# Patient Record
Sex: MALE | Race: WHITE | NOT HISPANIC OR LATINO | Employment: STUDENT | URBAN - METROPOLITAN AREA
[De-identification: names, ages, dates, MRNs, and addresses within clinical notes are randomized per-mention and may not be internally consistent; named-entity substitution may affect disease eponyms.]

---

## 2023-01-31 ENCOUNTER — OFFICE VISIT (OUTPATIENT)
Dept: FAMILY MEDICINE | Facility: CLINIC | Age: 28
End: 2023-01-31
Payer: COMMERCIAL

## 2023-01-31 VITALS
HEIGHT: 67 IN | BODY MASS INDEX: 20.72 KG/M2 | WEIGHT: 132 LBS | DIASTOLIC BLOOD PRESSURE: 72 MMHG | OXYGEN SATURATION: 99 % | SYSTOLIC BLOOD PRESSURE: 124 MMHG | HEART RATE: 110 BPM

## 2023-01-31 DIAGNOSIS — M54.6 CHRONIC RIGHT-SIDED THORACIC BACK PAIN: Primary | ICD-10-CM

## 2023-01-31 DIAGNOSIS — R55 SYNCOPE, UNSPECIFIED SYNCOPE TYPE: ICD-10-CM

## 2023-01-31 DIAGNOSIS — Z00.00 ENCOUNTER FOR MEDICAL EXAMINATION TO ESTABLISH CARE: ICD-10-CM

## 2023-01-31 DIAGNOSIS — R10.11 RUQ PAIN: ICD-10-CM

## 2023-01-31 DIAGNOSIS — Z79.899 ENCOUNTER FOR LONG-TERM (CURRENT) USE OF MEDICATIONS: ICD-10-CM

## 2023-01-31 DIAGNOSIS — G89.29 CHRONIC RIGHT-SIDED THORACIC BACK PAIN: Primary | ICD-10-CM

## 2023-01-31 DIAGNOSIS — Z11.59 NEED FOR HEPATITIS C SCREENING TEST: ICD-10-CM

## 2023-01-31 DIAGNOSIS — R55 SYNCOPE AND COLLAPSE: ICD-10-CM

## 2023-01-31 DIAGNOSIS — R42 DIZZINESS AND GIDDINESS: ICD-10-CM

## 2023-01-31 DIAGNOSIS — H53.8 BLURRED VISION: ICD-10-CM

## 2023-01-31 DIAGNOSIS — Z11.4 ENCOUNTER FOR SCREENING FOR HIV: ICD-10-CM

## 2023-01-31 PROBLEM — R07.9 CHEST PAIN: Status: ACTIVE | Noted: 2019-10-02

## 2023-01-31 PROBLEM — R00.2 INTERMITTENT PALPITATIONS: Status: ACTIVE | Noted: 2019-10-02

## 2023-01-31 PROBLEM — F41.9 ANXIETY: Status: ACTIVE | Noted: 2019-10-02

## 2023-01-31 PROCEDURE — 3074F PR MOST RECENT SYSTOLIC BLOOD PRESSURE < 130 MM HG: ICD-10-PCS | Mod: CPTII,S$GLB,, | Performed by: FAMILY MEDICINE

## 2023-01-31 PROCEDURE — 3078F PR MOST RECENT DIASTOLIC BLOOD PRESSURE < 80 MM HG: ICD-10-PCS | Mod: CPTII,S$GLB,, | Performed by: FAMILY MEDICINE

## 2023-01-31 PROCEDURE — 1160F PR REVIEW ALL MEDS BY PRESCRIBER/CLIN PHARMACIST DOCUMENTED: ICD-10-PCS | Mod: CPTII,S$GLB,, | Performed by: FAMILY MEDICINE

## 2023-01-31 PROCEDURE — 99204 OFFICE O/P NEW MOD 45 MIN: CPT | Mod: S$GLB,,, | Performed by: FAMILY MEDICINE

## 2023-01-31 PROCEDURE — 99204 PR OFFICE/OUTPT VISIT, NEW, LEVL IV, 45-59 MIN: ICD-10-PCS | Mod: S$GLB,,, | Performed by: FAMILY MEDICINE

## 2023-01-31 PROCEDURE — 3008F PR BODY MASS INDEX (BMI) DOCUMENTED: ICD-10-PCS | Mod: CPTII,S$GLB,, | Performed by: FAMILY MEDICINE

## 2023-01-31 PROCEDURE — 1159F PR MEDICATION LIST DOCUMENTED IN MEDICAL RECORD: ICD-10-PCS | Mod: CPTII,S$GLB,, | Performed by: FAMILY MEDICINE

## 2023-01-31 PROCEDURE — 3008F BODY MASS INDEX DOCD: CPT | Mod: CPTII,S$GLB,, | Performed by: FAMILY MEDICINE

## 2023-01-31 PROCEDURE — 99999 PR PBB SHADOW E&M-NEW PATIENT-LVL IV: CPT | Mod: PBBFAC,,, | Performed by: FAMILY MEDICINE

## 2023-01-31 PROCEDURE — 99999 PR PBB SHADOW E&M-NEW PATIENT-LVL IV: ICD-10-PCS | Mod: PBBFAC,,, | Performed by: FAMILY MEDICINE

## 2023-01-31 PROCEDURE — 1160F RVW MEDS BY RX/DR IN RCRD: CPT | Mod: CPTII,S$GLB,, | Performed by: FAMILY MEDICINE

## 2023-01-31 PROCEDURE — 3074F SYST BP LT 130 MM HG: CPT | Mod: CPTII,S$GLB,, | Performed by: FAMILY MEDICINE

## 2023-01-31 PROCEDURE — 3078F DIAST BP <80 MM HG: CPT | Mod: CPTII,S$GLB,, | Performed by: FAMILY MEDICINE

## 2023-01-31 PROCEDURE — 1159F MED LIST DOCD IN RCRD: CPT | Mod: CPTII,S$GLB,, | Performed by: FAMILY MEDICINE

## 2023-01-31 NOTE — ASSESSMENT & PLAN NOTE
Associated with other constellation of symptoms.  No tenderness on exam.  I recommend imaging with ultrasound to assess the liver and kidneys.  Do not suspect musculoskeletal at this time.  Checking labs.

## 2023-01-31 NOTE — PATIENT INSTRUCTIONS
Follow up in about 6 weeks (around 3/14/2023), or if symptoms worsen or fail to improve, for results.     Dear patient,   As a result of recent federal legislation (The Federal Cures Act), you may receive lab or pathology results from your visit in your [facility] account before your physician is able to contact you. Your physician or their representative will relay the results to you with their recommendations at their soonest availability.     If no improvement in symptoms or symptoms worsen, please be advised to call MD, follow-up at clinic and/or go to ER if becomes severe.    Jarod Tello M.D.        We Offer TELEHEALTH & Same Day Appointments!   Book your Telehealth appointment with me through my nurse or   Clinic appointments on [employer]!    [address] [place] [address]    Office: [phone]   FAX: [phone]    Check out my Facebook Page and Follow Me at: [email or web address]    Check out my website at [employer] by clicking on: [email or web address]    To Schedule appointments online, go to [employer]: [email or web address]

## 2023-01-31 NOTE — PROGRESS NOTES
PLAN:      Problem List Items Addressed This Visit       Chronic right-sided thoracic back pain - Primary (Chronic)     Associated with other constellation of symptoms.  No tenderness on exam.  I recommend imaging with ultrasound to assess the liver and kidneys.  Do not suspect musculoskeletal at this time.  Checking labs.         Relevant Orders    Lipid Panel    Hepatitis C Antibody    HIV 1/2 Ag/Ab (4th Gen)    Hemoglobin A1C    CBC Without Differential    Comprehensive Metabolic Panel    TSH    Urinalysis, Reflex to Urine Culture Urine, Clean Catch    US Abdomen Complete    Encounter for long-term (current) use of medications (Chronic)     Complete history and physical was completed today.  Complete and thorough medication reconciliation was performed.  Discussed risks and benefits of medications.  Advised patient on orders and health maintenance.  We discussed old records and old labs if available.  Will request any records not available through epic.  Continue current medications listed on your summary sheet.           Relevant Orders    Lipid Panel    Hepatitis C Antibody    HIV 1/2 Ag/Ab (4th Gen)    Hemoglobin A1C    CBC Without Differential    Comprehensive Metabolic Panel    TSH    Urinalysis, Reflex to Urine Culture Urine, Clean Catch    Encounter for medical examination to establish care     Complete history and physical was completed today.  Complete and thorough medication reconciliation was performed.  Discussed risks and benefits of medications.  Advised patient on orders and health maintenance.  We discussed old records and old labs if available.  Will request any records not available through epic.  Continue current medications listed on your summary sheet.           Relevant Orders    Lipid Panel    Hepatitis C Antibody    HIV 1/2 Ag/Ab (4th Gen)    Hemoglobin A1C    CBC Without Differential    Comprehensive Metabolic Panel    TSH    Urinalysis, Reflex to Urine Culture Urine, Clean Catch     Syncope and collapse     Reviewing records.  I recommend MRI of the brain with and without contrast full panel of labs also.  If no improvement or worsening consider referral back to Neurology and Cardiology for further evaluation.    The event that he had sound like it was vasovagal at the time.  However he is having chronic symptoms of headache, blurred vision and dizziness.          Relevant Orders    MRI Brain W WO Contrast    Blurred vision    Relevant Orders    Lipid Panel    Hepatitis C Antibody    HIV 1/2 Ag/Ab (4th Gen)    Hemoglobin A1C    CBC Without Differential    Comprehensive Metabolic Panel    TSH    Urinalysis, Reflex to Urine Culture Urine, Clean Catch    Dizziness and giddiness    Relevant Orders    MRI Brain W WO Contrast     Other Visit Diagnoses       RUQ pain        Relevant Orders    US Abdomen Complete    Encounter for screening for HIV        Relevant Orders    HIV 1/2 Ag/Ab (4th Gen)    Need for hepatitis C screening test        Relevant Orders    Hepatitis C Antibody        Patient due for hepatitis C and HIV screening.  He does have tattoos.  No other known screening test.  Patient is due for hepatitis C screening.   Patient advised of risk of ruy hepatitis C including being Born between 1944 and 1965, blood transfusions prior to 1992, IV or nasal drug use, tattoos, sexual intercourse.  Patient denies being tested.  Patient denies known history of hepatitis-C.    No results found for: HAV, HEPAIGM, HEPBIGM, HEPBCAB, HBEAG, HEPCAB    Future Appointments       Date Specialty Appt Notes    2/14/2023 Radiology     2/14/2023 Radiology            Medication Management for assessment above:       Jarod Tello M.D.  ==========================================================================  Subjective:   Patient ID: Malachi Gastelum is a 27 y.o. male.  has no past medical history on file.   Chief Complaint: Back Pain and Establish Care      Problem List Items Addressed This Visit        Chronic right-sided thoracic back pain - Primary (Chronic)    Overview     Chronic.  Right-sided mid back pain.  Patient reports pain is random and does not always occur with certain movements.  Patient reports he is not had any injury or trauma.  It has been on and off since October.  Patient is concerned about his liver as he was drinking large amount about alcohol previously.  Patient was drinking whiskey.  Denies any blood in the urine.         Current Assessment & Plan     Associated with other constellation of symptoms.  No tenderness on exam.  I recommend imaging with ultrasound to assess the liver and kidneys.  Do not suspect musculoskeletal at this time.  Checking labs.         Encounter for long-term (current) use of medications (Chronic)    Overview     CHRONIC. Stable. Compliant with medications for managed conditions. See medication list. No SE reported.   Routine lab analysis is being monitored. Refills were addressed.  No results found for: WBC, HGB, HCT, MCV, PLT      Chemistry    No results found for: NA, K, CL, CO2, BUN, CREATININE, GLU No results found for: CALCIUM, ALKPHOS, AST, ALT, BILITOT, ESTGFRAFRICA, EGFRNONAA       No results found for: TSH, P3BGNAW, Y0NTIJT, THYROIDAB, FREET4, T3FREE           Current Assessment & Plan     Complete history and physical was completed today.  Complete and thorough medication reconciliation was performed.  Discussed risks and benefits of medications.  Advised patient on orders and health maintenance.  We discussed old records and old labs if available.  Will request any records not available through epic.  Continue current medications listed on your summary sheet.           Encounter for medical examination to establish care    Overview     PREV PCP: MD Javad   Neuro peds years ago            Current Assessment & Plan     Complete history and physical was completed today.  Complete and thorough medication reconciliation was performed.  Discussed  risks and benefits of medications.  Advised patient on orders and health maintenance.  We discussed old records and old labs if available.  Will request any records not available through epic.  Continue current medications listed on your summary sheet.           Syncope and collapse    Overview     Chronic.  Recurrent.  Patient reports he has had generalized weakness spells since at least 2013 per medical records.  Patient's was seen by pediatric neurology.  More recently patient had episode of near-syncope after having a bowel movement and hit his head on a desk when walking back to his desk.  Patient states that he lost consciousness for a few seconds before regaining consciousness.  No seizure activity was noted.    He was evaluated at emergency department with chest x-ray EKG and blood work.         Current Assessment & Plan     Reviewing records.  I recommend MRI of the brain with and without contrast full panel of labs also.  If no improvement or worsening consider referral back to Neurology and Cardiology for further evaluation.    The event that he had sound like it was vasovagal at the time.  However he is having chronic symptoms of headache, blurred vision and dizziness.          Blurred vision    Dizziness and giddiness     Other Visit Diagnoses       RUQ pain        Encounter for screening for HIV        Need for hepatitis C screening test                 Review of patient's allergies indicates:   Allergen Reactions    Amoxicillin Other (See Comments)     Childhood unknown     No current outpatient medications   I have reviewed the PMH, social history, FamilyHx, surgical history, allergies and medications documented / confirmed by the patient at the time of this visit.  Review of Systems   Constitutional:  Negative for chills, fatigue, fever and unexpected weight change.   HENT:  Negative for ear pain and sore throat.    Eyes:  Negative for redness and visual disturbance.   Respiratory:  Negative for  "cough and shortness of breath.    Cardiovascular:  Negative for chest pain and palpitations.   Gastrointestinal:  Positive for abdominal pain. Negative for nausea and vomiting.   Endocrine: Negative for cold intolerance and heat intolerance.   Genitourinary:  Negative for difficulty urinating and hematuria.   Musculoskeletal:  Positive for arthralgias and back pain. Negative for myalgias.   Skin:  Negative for rash and wound.   Allergic/Immunologic: Negative for environmental allergies and food allergies.   Neurological:  Positive for syncope, weakness and headaches.   Hematological:  Negative for adenopathy. Does not bruise/bleed easily.   Psychiatric/Behavioral:  Negative for sleep disturbance. The patient is not nervous/anxious.    Objective:   /72   Pulse 110   Ht 5' 6.7" (1.694 m)   Wt 59.9 kg (132 lb)   SpO2 99%   BMI 20.86 kg/m²   Physical Exam  Vitals and nursing note reviewed.   Constitutional:       General: He is not in acute distress.     Appearance: He is well-developed. He is not ill-appearing or diaphoretic.   HENT:      Head: Normocephalic and atraumatic.      Right Ear: External ear normal.      Left Ear: External ear normal.      Nose: Nose normal. No rhinorrhea.   Eyes:      Extraocular Movements: Extraocular movements intact.      Pupils: Pupils are equal, round, and reactive to light.   Cardiovascular:      Rate and Rhythm: Normal rate.      Pulses: Normal pulses.   Pulmonary:      Effort: Pulmonary effort is normal. No respiratory distress.      Breath sounds: Normal breath sounds.   Abdominal:      General: Bowel sounds are normal.      Palpations: Abdomen is soft.      Tenderness: There is generalized abdominal tenderness. There is no right CVA tenderness, left CVA tenderness, guarding or rebound. Negative signs include Benson's sign, Rovsing's sign, McBurney's sign, psoas sign and obturator sign.      Hernia: No hernia is present.   Musculoskeletal:         General: Normal range " of motion.      Cervical back: Normal range of motion and neck supple.      Thoracic back: No swelling, edema, deformity, signs of trauma, spasms, tenderness or bony tenderness. Normal range of motion.      Lumbar back: No tenderness. Negative right straight leg raise test and negative left straight leg raise test.        Back:       Comments: Area of pain when it occurs however not currently having this pain.  Not reproducible on exam.   Skin:     General: Skin is warm and dry.      Capillary Refill: Capillary refill takes less than 2 seconds.      Findings: No rash.   Neurological:      General: No focal deficit present.      Mental Status: He is alert and oriented to person, place, and time. Mental status is at baseline.      Cranial Nerves: No cranial nerve deficit.      Motor: No weakness.      Gait: Gait normal.   Psychiatric:         Attention and Perception: He is attentive.         Mood and Affect: Mood normal. Mood is not anxious or depressed. Affect is not labile, blunt, angry or inappropriate.         Speech: He is communicative. Speech is not rapid and pressured, delayed, slurred or tangential.         Behavior: Behavior normal. Behavior is not agitated, slowed, aggressive, withdrawn, hyperactive or combative.         Thought Content: Thought content normal. Thought content is not paranoid or delusional. Thought content does not include homicidal or suicidal ideation. Thought content does not include homicidal or suicidal plan.         Cognition and Memory: Memory is not impaired.         Judgment: Judgment normal. Judgment is not impulsive or inappropriate.       Assessment:     1. Chronic right-sided thoracic back pain    2. Encounter for medical examination to establish care    3. Blurred vision    4. Syncope, unspecified syncope type    5. Encounter for long-term (current) use of medications    6. Dizziness and giddiness    7. Syncope and collapse    8. RUQ pain    9. Encounter for screening for HIV     10. Need for hepatitis C screening test      MDM:   High medical risk.  Moderate to high risk.  Total time: 46 minutes.  This includes total time spent on the encounter, which includes face to face time and non-face to face time preparing to see the patient (eg, review of previous medical records, tests), Obtaining and/or reviewing separately obtained history, documenting clinical information in the electronic or other health record, independently interpreting results (not separately reported)/communicating results to the patient/family/caregiver, and/or care coordination (not separately reported).    I have Reviewed and summarized old records.  I have performed thorough medication reconciliation today and discussed risk and benefits of medications.  I have reviewed labs and discussed with patient.  All questions were answered.  I am requesting old records and will review them once they are available.PREV PCP: MD Javad   Neuro peds years ago     I have signed for the following orders AND/OR meds.  Orders Placed This Encounter   Procedures    MRI Brain W WO Contrast     Standing Status:   Future     Standing Expiration Date:   1/31/2024     Order Specific Question:   Does the patient have a pacemaker or a defibrillator (Note: Some facilities may not be able to schedule an MRI for patients with pacemakers and defibrillators. You should contact your local radiology department to determine if this is the case.)?     Answer:   No     Order Specific Question:   Does the patient have an aneurysm or surgical clip, pump, nerve/brain stimulator, middle/inner ear prosthesis, or other metal implant or foreign object (bullet, shrapnel)? If they have a card related to their implant, ask them to bring it. Issues related to the implant may cause the MRI to be delayed.     Answer:   No     Order Specific Question:   Is the patient claustrophobic?     Answer:   No     Order Specific Question:   Will the patient require  sedation?     Answer:   No     Order Specific Question:   Does the patient have any of the following conditions? Diabetes, History of Renal Disease or Hypertension requiring medical therapy?     Answer:   No     Order Specific Question:   May the Radiologist modify the order per protocol to meet the clinical needs of the patient?     Answer:   Yes     Order Specific Question:   Is this part of a Research Study?     Answer:   No     Order Specific Question:   Does the patient have on a skin patch for medication with aluminized backing?     Answer:   No    US Abdomen Complete     Standing Status:   Future     Standing Expiration Date:   1/31/2024     Order Specific Question:   May the Radiologist modify the order per protocol to meet the clinical needs of the patient?     Answer:   Yes     Order Specific Question:   Release to patient     Answer:   Immediate    Lipid Panel     Standing Status:   Future     Standing Expiration Date:   3/31/2024    Hepatitis C Antibody     Standing Status:   Future     Standing Expiration Date:   3/31/2024    HIV 1/2 Ag/Ab (4th Gen)     Standing Status:   Future     Standing Expiration Date:   3/31/2024    Hemoglobin A1C     Standing Status:   Future     Standing Expiration Date:   3/31/2024    CBC Without Differential     Standing Status:   Future     Standing Expiration Date:   3/31/2024    Comprehensive Metabolic Panel     Standing Status:   Future     Standing Expiration Date:   3/31/2024    TSH     Standing Status:   Future     Standing Expiration Date:   3/31/2024    Urinalysis, Reflex to Urine Culture Urine, Clean Catch     Standing Status:   Future     Standing Expiration Date:   7/31/2024     Order Specific Question:   Preferred Collection Type     Answer:   Urine, Clean Catch     Order Specific Question:   Specimen Source     Answer:   Urine           Follow up in about 6 weeks (around 3/14/2023), or if symptoms worsen or fail to improve, for results.  Future Appointments        Date Specialty Appt Notes    2/14/2023 Radiology     2/14/2023 Radiology           If no improvement in symptoms or symptoms worsen, advised to call/follow-up at clinic or go to ER. Patient voiced understanding and all questions/concerns were addressed.   DISCLAIMER: This note was compiled by using a speech recognition dictation system and therefore please be aware that typographical / speech recognition errors can and do occur.  Please contact me if you see any errors specifically.    Jarod Tello M.D.       Office: [phone] [address] [place] [address]  FAX: [phone]

## 2023-01-31 NOTE — ASSESSMENT & PLAN NOTE
Reviewing records.  I recommend MRI of the brain with and without contrast full panel of labs also.  If no improvement or worsening consider referral back to Neurology and Cardiology for further evaluation.    The event that he had sound like it was vasovagal at the time.  However he is having chronic symptoms of headache, blurred vision and dizziness.

## 2023-02-03 ENCOUNTER — LAB VISIT (OUTPATIENT)
Dept: LAB | Facility: HOSPITAL | Age: 28
End: 2023-02-03
Attending: FAMILY MEDICINE
Payer: COMMERCIAL

## 2023-02-03 DIAGNOSIS — G89.29 CHRONIC RIGHT-SIDED THORACIC BACK PAIN: ICD-10-CM

## 2023-02-03 DIAGNOSIS — Z00.00 ENCOUNTER FOR MEDICAL EXAMINATION TO ESTABLISH CARE: ICD-10-CM

## 2023-02-03 DIAGNOSIS — H53.8 BLURRED VISION: ICD-10-CM

## 2023-02-03 DIAGNOSIS — R55 SYNCOPE, UNSPECIFIED SYNCOPE TYPE: ICD-10-CM

## 2023-02-03 DIAGNOSIS — Z11.59 NEED FOR HEPATITIS C SCREENING TEST: ICD-10-CM

## 2023-02-03 DIAGNOSIS — Z79.899 ENCOUNTER FOR LONG-TERM (CURRENT) USE OF MEDICATIONS: ICD-10-CM

## 2023-02-03 DIAGNOSIS — M54.6 CHRONIC RIGHT-SIDED THORACIC BACK PAIN: ICD-10-CM

## 2023-02-03 DIAGNOSIS — Z11.4 ENCOUNTER FOR SCREENING FOR HIV: ICD-10-CM

## 2023-02-03 LAB
ALBUMIN SERPL BCP-MCNC: 4.8 G/DL (ref 3.5–5.2)
ALP SERPL-CCNC: 84 U/L (ref 55–135)
ALT SERPL W/O P-5'-P-CCNC: 22 U/L (ref 10–44)
ANION GAP SERPL CALC-SCNC: 14 MMOL/L (ref 8–16)
AST SERPL-CCNC: 23 U/L (ref 10–40)
BILIRUB SERPL-MCNC: 0.8 MG/DL (ref 0.1–1)
BUN SERPL-MCNC: 9 MG/DL (ref 6–20)
CALCIUM SERPL-MCNC: 10.7 MG/DL (ref 8.7–10.5)
CHLORIDE SERPL-SCNC: 104 MMOL/L (ref 95–110)
CHOLEST SERPL-MCNC: 185 MG/DL (ref 120–199)
CHOLEST/HDLC SERPL: 3.2 {RATIO} (ref 2–5)
CO2 SERPL-SCNC: 22 MMOL/L (ref 23–29)
CREAT SERPL-MCNC: 1.1 MG/DL (ref 0.5–1.4)
ERYTHROCYTE [DISTWIDTH] IN BLOOD BY AUTOMATED COUNT: 12 % (ref 11.5–14.5)
EST. GFR  (NO RACE VARIABLE): >60 ML/MIN/1.73 M^2
ESTIMATED AVG GLUCOSE: 94 MG/DL (ref 68–131)
GLUCOSE SERPL-MCNC: 81 MG/DL (ref 70–110)
HBA1C MFR BLD: 4.9 % (ref 4–5.6)
HCT VFR BLD AUTO: 49.1 % (ref 40–54)
HCV AB SERPL QL IA: NORMAL
HDLC SERPL-MCNC: 57 MG/DL (ref 40–75)
HDLC SERPL: 30.8 % (ref 20–50)
HGB BLD-MCNC: 16.4 G/DL (ref 14–18)
HIV 1+2 AB+HIV1 P24 AG SERPL QL IA: NORMAL
LDLC SERPL CALC-MCNC: 113 MG/DL (ref 63–159)
MCH RBC QN AUTO: 31.9 PG (ref 27–31)
MCHC RBC AUTO-ENTMCNC: 33.4 G/DL (ref 32–36)
MCV RBC AUTO: 96 FL (ref 82–98)
NONHDLC SERPL-MCNC: 128 MG/DL
PLATELET # BLD AUTO: 269 K/UL (ref 150–450)
PMV BLD AUTO: 9.9 FL (ref 9.2–12.9)
POTASSIUM SERPL-SCNC: 4.6 MMOL/L (ref 3.5–5.1)
PROT SERPL-MCNC: 7.6 G/DL (ref 6–8.4)
RBC # BLD AUTO: 5.14 M/UL (ref 4.6–6.2)
SODIUM SERPL-SCNC: 140 MMOL/L (ref 136–145)
TRIGL SERPL-MCNC: 75 MG/DL (ref 30–150)
TSH SERPL DL<=0.005 MIU/L-ACNC: 1.56 UIU/ML (ref 0.4–4)
WBC # BLD AUTO: 7.38 K/UL (ref 3.9–12.7)

## 2023-02-03 PROCEDURE — 36415 COLL VENOUS BLD VENIPUNCTURE: CPT | Mod: PO | Performed by: FAMILY MEDICINE

## 2023-02-03 PROCEDURE — 83036 HEMOGLOBIN GLYCOSYLATED A1C: CPT | Performed by: FAMILY MEDICINE

## 2023-02-03 PROCEDURE — 87389 HIV-1 AG W/HIV-1&-2 AB AG IA: CPT | Performed by: FAMILY MEDICINE

## 2023-02-03 PROCEDURE — 85027 COMPLETE CBC AUTOMATED: CPT | Performed by: FAMILY MEDICINE

## 2023-02-03 PROCEDURE — 80053 COMPREHEN METABOLIC PANEL: CPT | Performed by: FAMILY MEDICINE

## 2023-02-03 PROCEDURE — 80061 LIPID PANEL: CPT | Performed by: FAMILY MEDICINE

## 2023-02-03 PROCEDURE — 86803 HEPATITIS C AB TEST: CPT | Performed by: FAMILY MEDICINE

## 2023-02-03 PROCEDURE — 84443 ASSAY THYROID STIM HORMONE: CPT | Performed by: FAMILY MEDICINE

## 2023-02-06 DIAGNOSIS — R31.9 HEMATURIA, UNSPECIFIED TYPE: Primary | ICD-10-CM

## 2023-02-07 ENCOUNTER — LAB VISIT (OUTPATIENT)
Dept: LAB | Facility: HOSPITAL | Age: 28
End: 2023-02-07
Attending: FAMILY MEDICINE
Payer: COMMERCIAL

## 2023-02-07 ENCOUNTER — PATIENT MESSAGE (OUTPATIENT)
Dept: FAMILY MEDICINE | Facility: CLINIC | Age: 28
End: 2023-02-07
Payer: COMMERCIAL

## 2023-02-07 DIAGNOSIS — R31.9 HEMATURIA, UNSPECIFIED TYPE: ICD-10-CM

## 2023-02-07 LAB
BILIRUB UR QL STRIP: NEGATIVE
CLARITY UR: CLEAR
COLOR UR: YELLOW
GLUCOSE UR QL STRIP: NEGATIVE
HGB UR QL STRIP: ABNORMAL
KETONES UR QL STRIP: NEGATIVE
LEUKOCYTE ESTERASE UR QL STRIP: NEGATIVE
NITRITE UR QL STRIP: NEGATIVE
PH UR STRIP: 6 [PH] (ref 5–8)
PROT UR QL STRIP: NEGATIVE
SP GR UR STRIP: 1.02 (ref 1–1.03)
URN SPEC COLLECT METH UR: ABNORMAL

## 2023-02-07 PROCEDURE — 81003 URINALYSIS AUTO W/O SCOPE: CPT | Mod: PO | Performed by: FAMILY MEDICINE

## 2023-02-07 NOTE — PROGRESS NOTES
Make follow-up lab appointment per recommendation below.  Check to see if patient has seen the results through my chart.  If not then,  #CALL THE PATIENT# to discuss results/see if they have questions and document verification of contact. Make F/U appt if needed. [phone]    #My interpretation that was sent to them through [employer]:  Henrique, I have reviewed your recent blood work.     Hepatitis-C and HIV screening are nonreactive.  Your complete blood count is normal, no anemia.    Your metabolic panel which shows your glucose, kidney function, electrolytes, and liver function is mostly within normal limits except for elevated calcium.  Reduce any calcium supplement you may be taking.  If no improvement with repeat labs consider checking for PTH and vitamin-D levels.  Thyroid study is normal.   Your cholesterol is normal.    Your hemoglobin A1c is normal.  This test is gold standard screening test for diabetes.  It is a measures 3 months of your average blood sugar.  =========================  Also please address any outstanding health maintenance that may be due: COVID-19 Vaccine(1) Never done  TETANUS VACCINE due on 07/20/2017  Influenza Vaccine(1) due on 09/01/2022

## 2023-02-07 NOTE — PROGRESS NOTES
1st check to see if patient has seen the results.  If not then  CALL patient with results and Document verification.  Schedule follow-up if needed.  [phone]    Urinalysis continues to show trace blood.  I recommend referral to Urology for further evaluation.  I have placed an order for urology referral.

## 2023-02-08 PROBLEM — Z00.00 ENCOUNTER FOR MEDICAL EXAMINATION TO ESTABLISH CARE: Status: RESOLVED | Noted: 2023-01-31 | Resolved: 2023-02-08

## 2023-02-09 ENCOUNTER — OFFICE VISIT (OUTPATIENT)
Dept: UROLOGY | Facility: CLINIC | Age: 28
End: 2023-02-09
Payer: COMMERCIAL

## 2023-02-09 VITALS
HEART RATE: 79 BPM | SYSTOLIC BLOOD PRESSURE: 135 MMHG | WEIGHT: 129.69 LBS | HEIGHT: 66 IN | BODY MASS INDEX: 20.84 KG/M2 | DIASTOLIC BLOOD PRESSURE: 86 MMHG

## 2023-02-09 DIAGNOSIS — Z84.1 FAMILY HISTORY OF KIDNEY STONES: Primary | ICD-10-CM

## 2023-02-09 DIAGNOSIS — R31.9 HEMATURIA, UNSPECIFIED TYPE: ICD-10-CM

## 2023-02-09 LAB
BACTERIA #/AREA URNS AUTO: NORMAL /HPF
MICROSCOPIC COMMENT: NORMAL
RBC #/AREA URNS AUTO: 1 /HPF (ref 0–4)
WBC #/AREA URNS AUTO: 0 /HPF (ref 0–5)

## 2023-02-09 PROCEDURE — 1160F RVW MEDS BY RX/DR IN RCRD: CPT | Mod: CPTII,S$GLB,,

## 2023-02-09 PROCEDURE — 3079F DIAST BP 80-89 MM HG: CPT | Mod: CPTII,S$GLB,,

## 2023-02-09 PROCEDURE — 3044F PR MOST RECENT HEMOGLOBIN A1C LEVEL <7.0%: ICD-10-PCS | Mod: CPTII,S$GLB,,

## 2023-02-09 PROCEDURE — 99204 OFFICE O/P NEW MOD 45 MIN: CPT | Mod: S$GLB,,,

## 2023-02-09 PROCEDURE — 99999 PR PBB SHADOW E&M-EST. PATIENT-LVL III: ICD-10-PCS | Mod: PBBFAC,,,

## 2023-02-09 PROCEDURE — 1160F PR REVIEW ALL MEDS BY PRESCRIBER/CLIN PHARMACIST DOCUMENTED: ICD-10-PCS | Mod: CPTII,S$GLB,,

## 2023-02-09 PROCEDURE — 99999 PR PBB SHADOW E&M-EST. PATIENT-LVL III: CPT | Mod: PBBFAC,,,

## 2023-02-09 PROCEDURE — 81001 URINALYSIS AUTO W/SCOPE: CPT

## 2023-02-09 PROCEDURE — 3044F HG A1C LEVEL LT 7.0%: CPT | Mod: CPTII,S$GLB,,

## 2023-02-09 PROCEDURE — 3075F SYST BP GE 130 - 139MM HG: CPT | Mod: CPTII,S$GLB,,

## 2023-02-09 PROCEDURE — 3008F PR BODY MASS INDEX (BMI) DOCUMENTED: ICD-10-PCS | Mod: CPTII,S$GLB,,

## 2023-02-09 PROCEDURE — 99204 PR OFFICE/OUTPT VISIT, NEW, LEVL IV, 45-59 MIN: ICD-10-PCS | Mod: S$GLB,,,

## 2023-02-09 PROCEDURE — 1159F PR MEDICATION LIST DOCUMENTED IN MEDICAL RECORD: ICD-10-PCS | Mod: CPTII,S$GLB,,

## 2023-02-09 PROCEDURE — 3075F PR MOST RECENT SYSTOLIC BLOOD PRESS GE 130-139MM HG: ICD-10-PCS | Mod: CPTII,S$GLB,,

## 2023-02-09 PROCEDURE — 3008F BODY MASS INDEX DOCD: CPT | Mod: CPTII,S$GLB,,

## 2023-02-09 PROCEDURE — 3079F PR MOST RECENT DIASTOLIC BLOOD PRESSURE 80-89 MM HG: ICD-10-PCS | Mod: CPTII,S$GLB,,

## 2023-02-09 PROCEDURE — 1159F MED LIST DOCD IN RCRD: CPT | Mod: CPTII,S$GLB,,

## 2023-02-09 PROCEDURE — 87086 URINE CULTURE/COLONY COUNT: CPT

## 2023-02-09 NOTE — PROGRESS NOTES
CHIEF COMPLAINT:  Microscopic hematuria      HISTORY OF PRESENTING ILLINESS:  Malachi Gastelum is a 27 y.o. male new to urology. Here today as a referral from Dr. Tello for blood on UA dip. PMHx includes anxiety, chest pain with palpitations, blurred vision, chronic R side thoracic pain, dizziness, syncope and collapse.     Colorblind, unable to see whether his urine has blood or not  Tobacco use: 10 year use of chewing tobacco, now 1-2 cigarettes/day   No exposure to harmful chemicals   No radiation to pelvis or chemotherapy  Etoh use 2 years heavy   No history of kidney stones  Sister passes stones frequently   C/o mid morning frequency (voids 2x in 1 hour most mornings)  R side flank pain intermittently x 2 months - hasn't tried OTC pain medications. Pain does not occur daily.   Daily liquid intake: 2 cans Dr. Pepper, Gatorade, 3 bottles water. No etoh currently   Negative family hx bladder, kidney or prostate cancer       REVIEW OF SYSTEMS:  Review of Systems   Constitutional:  Negative for chills and fever.   HENT:  Negative for congestion and sore throat.    Respiratory:  Negative for cough and shortness of breath.    Cardiovascular:  Negative for chest pain and palpitations.   Gastrointestinal:  Negative for nausea and vomiting.   Genitourinary:  Negative for dysuria, flank pain, frequency, hematuria and urgency.   Neurological:  Negative for dizziness and headaches.       PATIENT HISTORY:    History reviewed. No pertinent past medical history.    History reviewed. No pertinent surgical history.    Family History   Problem Relation Age of Onset    Breast cancer Mother     Urolithiasis Sister     Heart disease Maternal Grandfather     Diabetes Paternal Grandmother     Heart disease Paternal Grandfather     Heart attack Maternal Uncle        Social History     Socioeconomic History    Marital status: Single   Tobacco Use    Smoking status: Some Days     Packs/day: 0.25     Types: Cigarettes     Start date:  1/1/2013    Tobacco comments:     Chewing tobacco hx    Substance and Sexual Activity    Alcohol use: Not Currently     Comment: Former alcohol abuse with whiskey    Drug use: No    Sexual activity: Never       Allergies:  Amoxicillin    Medications:  No current outpatient medications on file.    PHYSICAL EXAMINATION:  Physical Exam  Constitutional:       Appearance: Normal appearance.   HENT:      Head: Normocephalic and atraumatic.      Right Ear: External ear normal.      Left Ear: External ear normal.   Pulmonary:      Effort: Pulmonary effort is normal. No respiratory distress.   Abdominal:      Tenderness: There is no right CVA tenderness or left CVA tenderness.   Skin:     General: Skin is warm and dry.   Neurological:      General: No focal deficit present.      Mental Status: He is alert and oriented to person, place, and time.   Psychiatric:         Mood and Affect: Mood normal.         Behavior: Behavior normal.       LABS:  UA dip in office: WNL      Lab Results   Component Value Date    CREATININE 1.1 02/03/2023    EGFRNORACEVR >60.0 02/03/2023         IMPRESSION:  Encounter Diagnoses   Name Primary?    Family history of kidney stones Yes    Hematuria, unspecified type          Assessment:       1. Family history of kidney stones    2. Hematuria, unspecified type          Plan:   - urine collected and sent for micro UA and culture  - increase water intake  - US abdomen complete ordered for 2/14/23  - US kidneys to assess for stones 2/17/23 - will cancel if kidneys are visualized completely with complete abdominal US    If microscopic UA has more than 3 rbc/hpf without infection, will proceed with hematuria workup. I explained to the patient that the causes of hematuria, whether it be gross hematuria or microhematuria, are many, including but not limited to  infections, kidney stones,  malignancy. Fortunately, for patients with microhematuria, the likelihood of finding an underlying  malignancy as  the cause of the hematuria is very low at 1-2%. In patients with gross hematuria, the chances of an underlying  malignancy are higher but still low at 15-20%.    Follow up dependent on lab results, if benign findings, f/u PRN    I spent 45 minutes with the patient of which more than half was spent in direct consultation with the patient in regards to our treatment and plan.  We addressed the office findings and recent labs.   Education and recommendations of today's plan of care including home remedies and needed follow up with PCP.   We discussed the chief complaint; reviewed the LUTS and the possible contributory factors.   Recommended lifestyle modifications with proper, healthy diet, good hydration if no fluid restrictions; reducing bladder irritants.   Benefits of regular exercise.

## 2023-02-10 LAB — BACTERIA UR CULT: NO GROWTH

## 2023-02-14 ENCOUNTER — HOSPITAL ENCOUNTER (OUTPATIENT)
Dept: RADIOLOGY | Facility: HOSPITAL | Age: 28
Discharge: HOME | End: 2023-02-14
Attending: FAMILY MEDICINE
Payer: COMMERCIAL

## 2023-02-14 DIAGNOSIS — M54.6 CHRONIC RIGHT-SIDED THORACIC BACK PAIN: ICD-10-CM

## 2023-02-14 DIAGNOSIS — R10.11 RUQ PAIN: ICD-10-CM

## 2023-02-14 DIAGNOSIS — R42 DIZZINESS AND GIDDINESS: ICD-10-CM

## 2023-02-14 DIAGNOSIS — R55 SYNCOPE AND COLLAPSE: ICD-10-CM

## 2023-02-14 DIAGNOSIS — G89.29 CHRONIC RIGHT-SIDED THORACIC BACK PAIN: ICD-10-CM

## 2023-02-14 PROCEDURE — 70553 MRI BRAIN STEM W/O & W/DYE: CPT | Mod: 26,,, | Performed by: RADIOLOGY

## 2023-02-14 PROCEDURE — 25500020 PHARM REV CODE 255: Mod: PO | Performed by: FAMILY MEDICINE

## 2023-02-14 PROCEDURE — A9585 GADOBUTROL INJECTION: HCPCS | Mod: PO | Performed by: FAMILY MEDICINE

## 2023-02-14 PROCEDURE — 70553 MRI BRAIN W WO CONTRAST: ICD-10-PCS | Mod: 26,,, | Performed by: RADIOLOGY

## 2023-02-14 PROCEDURE — 76700 US ABDOMEN COMPLETE: ICD-10-PCS | Mod: 26,,, | Performed by: RADIOLOGY

## 2023-02-14 PROCEDURE — 76700 US EXAM ABDOM COMPLETE: CPT | Mod: 26,,, | Performed by: RADIOLOGY

## 2023-02-14 PROCEDURE — 70553 MRI BRAIN STEM W/O & W/DYE: CPT | Mod: TC,PO

## 2023-02-14 PROCEDURE — 76700 US EXAM ABDOM COMPLETE: CPT | Mod: TC,PO

## 2023-02-14 RX ORDER — GADOBUTROL 604.72 MG/ML
5 INJECTION INTRAVENOUS
Status: COMPLETED | OUTPATIENT
Start: 2023-02-14 | End: 2023-02-14

## 2023-02-14 RX ADMIN — GADOBUTROL 5 ML: 604.72 INJECTION INTRAVENOUS at 03:02

## 2023-02-14 NOTE — PROGRESS NOTES
1st check to see if patient has seen the results.  If not then  CALL patient with results and Document verification.  Schedule follow-up if needed.  [phone]  Ultrasound of the abdomen is normal.

## 2023-02-17 ENCOUNTER — HOSPITAL ENCOUNTER (OUTPATIENT)
Dept: RADIOLOGY | Facility: HOSPITAL | Age: 28
Discharge: HOME | End: 2023-02-17
Payer: COMMERCIAL

## 2023-02-17 DIAGNOSIS — Z84.1 FAMILY HISTORY OF KIDNEY STONES: ICD-10-CM

## 2023-02-17 DIAGNOSIS — R31.9 HEMATURIA, UNSPECIFIED TYPE: ICD-10-CM

## 2023-02-17 PROCEDURE — 76770 US EXAM ABDO BACK WALL COMP: CPT | Mod: 26,,, | Performed by: RADIOLOGY

## 2023-02-17 PROCEDURE — 76770 US EXAM ABDO BACK WALL COMP: CPT | Mod: TC,PO

## 2023-02-17 PROCEDURE — 76770 US RETROPERITONEAL COMPLETE: ICD-10-PCS | Mod: 26,,, | Performed by: RADIOLOGY

## 2024-05-18 ENCOUNTER — PATIENT MESSAGE (OUTPATIENT)
Dept: FAMILY MEDICINE | Facility: CLINIC | Age: 29
End: 2024-05-18
Payer: COMMERCIAL

## 2024-07-09 ENCOUNTER — PATIENT MESSAGE (OUTPATIENT)
Dept: RESEARCH | Facility: HOSPITAL | Age: 29
End: 2024-07-09
Payer: COMMERCIAL

## 2024-09-11 ENCOUNTER — PATIENT MESSAGE (OUTPATIENT)
Dept: FAMILY MEDICINE | Facility: CLINIC | Age: 29
End: 2024-09-11
Payer: COMMERCIAL